# Patient Record
Sex: FEMALE | Race: WHITE | NOT HISPANIC OR LATINO | Employment: UNEMPLOYED | URBAN - METROPOLITAN AREA
[De-identification: names, ages, dates, MRNs, and addresses within clinical notes are randomized per-mention and may not be internally consistent; named-entity substitution may affect disease eponyms.]

---

## 2017-03-22 ENCOUNTER — OFFICE VISIT (OUTPATIENT)
Dept: SPORTS MEDICINE | Facility: CLINIC | Age: 52
End: 2017-03-22

## 2017-03-22 VITALS
BODY MASS INDEX: 21.34 KG/M2 | HEIGHT: 64 IN | RESPIRATION RATE: 16 BRPM | HEART RATE: 72 BPM | WEIGHT: 125 LBS | SYSTOLIC BLOOD PRESSURE: 132 MMHG | DIASTOLIC BLOOD PRESSURE: 82 MMHG | OXYGEN SATURATION: 99 %

## 2017-03-22 DIAGNOSIS — G89.29 CHRONIC PAIN OF RIGHT KNEE: Primary | ICD-10-CM

## 2017-03-22 DIAGNOSIS — M25.561 CHRONIC PAIN OF RIGHT KNEE: Primary | ICD-10-CM

## 2017-03-22 PROCEDURE — 20610 DRAIN/INJ JOINT/BURSA W/O US: CPT | Performed by: FAMILY MEDICINE

## 2017-03-22 PROCEDURE — 73562 X-RAY EXAM OF KNEE 3: CPT | Performed by: FAMILY MEDICINE

## 2017-03-22 PROCEDURE — 99204 OFFICE O/P NEW MOD 45 MIN: CPT | Performed by: FAMILY MEDICINE

## 2017-03-22 RX ORDER — TRIAMCINOLONE ACETONIDE 40 MG/ML
80 INJECTION, SUSPENSION INTRA-ARTICULAR; INTRAMUSCULAR ONCE
Status: COMPLETED | OUTPATIENT
Start: 2017-03-22 | End: 2017-03-22

## 2017-03-22 RX ORDER — LORATADINE 10 MG/1
10 TABLET ORAL DAILY
COMMUNITY
End: 2020-01-15

## 2017-03-22 RX ADMIN — TRIAMCINOLONE ACETONIDE 80 MG: 40 INJECTION, SUSPENSION INTRA-ARTICULAR; INTRAMUSCULAR at 15:11

## 2017-03-22 NOTE — PROGRESS NOTES
"Kita is a 51 y.o. year old female    Chief Complaint   Patient presents with   • Knee Pain     Rt knee pain for a month. Has gotten worse in the last week.        History of Present Illness  Here today for R knee pain for about a month. Mostly posterior, started about a month ago without any specific injury but worsening after exercise. Moderately severe. No assoc sx or radiation.    pmh left meniscus tear 4 years ago, treated with PT; had tight ITB; also had visco injections    I have reviewed the patient's medical, family, and social history in detail and updated the computerized patient record.    Review of Systems   Constitutional: Negative for fever.   Skin: Negative for wound.   Neurological: Negative for numbness.   All other systems reviewed and are negative.      /82 (BP Location: Left arm, Patient Position: Sitting, Cuff Size: Adult)  Pulse 72  Resp 16  Ht 63.5\" (161.3 cm)  Wt 125 lb (56.7 kg)  SpO2 99%  BMI 21.8 kg/m2     Physical Exam    Vital signs reviewed.   General: No acute distress.  Eyes: conjunctiva clear; pupils equally round and reactive  ENT: external ears and nose atraumatic; oropharynx clear  CV: no peripheral edema, 2+ distal pulses  Resp: normal respiratory effort, no use of accessory muscles  Skin: no rashes or wounds; normal turgor  Psych: mood and affect appropriate; recent and remote memory intact  Neuro: sensation to light touch intact    MSK Exam:  R knee: Normal appearance. TTP medial joint line, also mildly in the patellofemoral space. Normal ROM; mild pain with end range flexion. Mildly positive yara. No laxity.    Right Knee X-Ray  Indication: Pain    AP, Lateral, and [place] views    Findings:  No fracture  No bony lesion  Normal soft tissues  Mild degenerative changes, mostly medial and patellofemoral. There is a chronic patellar ossification.    No prior studies were available for comparison.    Knee Injection Procedure Note    Right knee injection was " "discussed with the patient in detail, including indication, risks, benefits, and alternatives. Verbal consent was given for the procedure. Injection was performed by MD.  Injection site was identified by physical examination and cleaned with Betadine and alcohol swabs. Prior to needle insertion, ethyl chloride spray was used for surface anesthesia. Sterile technique was used.  A 25-gauge, 1.5\" needle was directed to the joint from a(n) lateral and anterior approach. Injectate was passed into the joint space without difficulty. The needle was removed and a simple bandage was applied. The procedure was tolerated well without difficulty.    Injection mixture:  1% lidocaine without epinephrine: 2 mL  0.5% bupivacaine without epinephrine: 2 mL  40 mg/mL triamcinolone acetonide: 2 mL    Diagnoses and all orders for this visit:    Chronic pain of right knee  -     XR Knee 3+ View With [place] Right  -     triamcinolone acetonide (KENALOG-40) injection 80 mg; Inject 2 mL into the joint 1 (One) Time.    Other orders  -     loratadine (CLARITIN) 10 MG tablet; Take 10 mg by mouth Daily.  -     PENNSAID 2 % solution; Apply 1-2 pumps to affected area BID prn pain    Discussed potential underlying diagnoses; flare of OA vs meniscal tear being most likely. Agreed on injection today which was tolerated well. F/u based on progress.  "

## 2017-05-03 ENCOUNTER — OFFICE VISIT (OUTPATIENT)
Dept: SPORTS MEDICINE | Facility: CLINIC | Age: 52
End: 2017-05-03

## 2017-05-03 VITALS
HEIGHT: 64 IN | BODY MASS INDEX: 22.02 KG/M2 | DIASTOLIC BLOOD PRESSURE: 72 MMHG | HEART RATE: 72 BPM | SYSTOLIC BLOOD PRESSURE: 115 MMHG | OXYGEN SATURATION: 100 % | WEIGHT: 129 LBS | RESPIRATION RATE: 14 BRPM

## 2017-05-03 DIAGNOSIS — M25.361 KNEE INSTABILITY, RIGHT: ICD-10-CM

## 2017-05-03 DIAGNOSIS — M25.561 ACUTE PAIN OF RIGHT KNEE: Primary | ICD-10-CM

## 2017-05-03 DIAGNOSIS — M25.461 KNEE EFFUSION, RIGHT: ICD-10-CM

## 2017-05-03 PROCEDURE — 99213 OFFICE O/P EST LOW 20 MIN: CPT | Performed by: FAMILY MEDICINE

## 2017-05-03 RX ORDER — PREDNISONE 50 MG/1
50 TABLET ORAL DAILY
Qty: 7 TABLET | Refills: 0 | Status: SHIPPED | OUTPATIENT
Start: 2017-05-03 | End: 2017-05-10

## 2017-05-10 ENCOUNTER — HOSPITAL ENCOUNTER (OUTPATIENT)
Dept: MRI IMAGING | Facility: HOSPITAL | Age: 52
Discharge: HOME | End: 2017-05-10
Admitting: FAMILY MEDICINE

## 2017-05-10 DIAGNOSIS — M25.361 KNEE INSTABILITY, RIGHT: ICD-10-CM

## 2017-05-10 DIAGNOSIS — M25.561 ACUTE PAIN OF RIGHT KNEE: ICD-10-CM

## 2017-05-10 DIAGNOSIS — M25.461 KNEE EFFUSION, RIGHT: ICD-10-CM

## 2017-05-10 PROCEDURE — 73721 MRI JNT OF LWR EXTRE W/O DYE: CPT

## 2017-05-15 ENCOUNTER — TELEPHONE (OUTPATIENT)
Dept: SPORTS MEDICINE | Facility: CLINIC | Age: 52
End: 2017-05-15

## 2017-05-22 ENCOUNTER — OFFICE VISIT (OUTPATIENT)
Dept: SPORTS MEDICINE | Facility: CLINIC | Age: 52
End: 2017-05-22

## 2017-05-22 VITALS
OXYGEN SATURATION: 98 % | BODY MASS INDEX: 22.2 KG/M2 | WEIGHT: 130 LBS | SYSTOLIC BLOOD PRESSURE: 112 MMHG | HEIGHT: 64 IN | HEART RATE: 75 BPM | DIASTOLIC BLOOD PRESSURE: 80 MMHG

## 2017-05-22 DIAGNOSIS — S83.241A ACUTE TEAR MEDIAL MENISCUS, RIGHT, INITIAL ENCOUNTER: ICD-10-CM

## 2017-05-22 DIAGNOSIS — M25.561 ACUTE PAIN OF RIGHT KNEE: Primary | ICD-10-CM

## 2017-05-22 PROCEDURE — 99213 OFFICE O/P EST LOW 20 MIN: CPT | Performed by: FAMILY MEDICINE

## 2017-05-25 ENCOUNTER — OFFICE VISIT (OUTPATIENT)
Dept: SPORTS MEDICINE | Facility: CLINIC | Age: 52
End: 2017-05-25

## 2017-05-25 VITALS
HEART RATE: 82 BPM | OXYGEN SATURATION: 100 % | SYSTOLIC BLOOD PRESSURE: 110 MMHG | DIASTOLIC BLOOD PRESSURE: 70 MMHG | RESPIRATION RATE: 14 BRPM | BODY MASS INDEX: 22.2 KG/M2 | WEIGHT: 130 LBS | HEIGHT: 64 IN

## 2017-05-25 DIAGNOSIS — S83.241D ACUTE TEAR MEDIAL MENISCUS, RIGHT, SUBSEQUENT ENCOUNTER: Primary | ICD-10-CM

## 2017-05-25 PROCEDURE — 0232T NJX PLATELET PLASMA: CPT | Performed by: FAMILY MEDICINE

## 2017-06-07 ENCOUNTER — OFFICE VISIT (OUTPATIENT)
Dept: SPORTS MEDICINE | Facility: CLINIC | Age: 52
End: 2017-06-07

## 2017-06-07 VITALS
HEART RATE: 82 BPM | HEIGHT: 64 IN | DIASTOLIC BLOOD PRESSURE: 76 MMHG | OXYGEN SATURATION: 98 % | BODY MASS INDEX: 22.02 KG/M2 | SYSTOLIC BLOOD PRESSURE: 122 MMHG | WEIGHT: 129 LBS | TEMPERATURE: 98.1 F

## 2017-06-07 DIAGNOSIS — J30.1 SEASONAL ALLERGIC RHINITIS DUE TO POLLEN: ICD-10-CM

## 2017-06-07 DIAGNOSIS — Z86.010 HISTORY OF COLON POLYPS: ICD-10-CM

## 2017-06-07 DIAGNOSIS — M25.561 CHRONIC PAIN OF RIGHT KNEE: ICD-10-CM

## 2017-06-07 DIAGNOSIS — S83.241D ACUTE TEAR MEDIAL MENISCUS, RIGHT, SUBSEQUENT ENCOUNTER: Primary | ICD-10-CM

## 2017-06-07 DIAGNOSIS — G89.29 CHRONIC PAIN OF RIGHT KNEE: ICD-10-CM

## 2017-06-07 PROCEDURE — 99214 OFFICE O/P EST MOD 30 MIN: CPT | Performed by: FAMILY MEDICINE

## 2017-06-07 NOTE — PROGRESS NOTES
"Kita is a 51 y.o. year old female    Chief Complaint   Patient presents with   • Establish Care     R knee pain, AR       History of Present Illness   HPI Comments: R knee pain, medial meniscus tear: She is 2 weeks since PRP injection by me.  First week after injection, she did have significant amount of pain but this has subsided.  Occasionally feels a click but overall she is feeling much better.  Has yet to do any physical activity as recommended.  Would like to get back in to physical therapy as in the past, she has had problems with iliotibial band syndrome.    History of colon polyps: Last colonoscopy approximately 2-1/2 years ago and she had a polyp removed.  Maternal history of colon cancer diagnosed in her 50s.  She is due for repeat colonoscopy this fall.    AR: Persistent.  Worsened since she moved from [place] to [place].  Takes Claritin which helps.    HMP: Is due for mammogram, Pap smear this year.       I have reviewed the patient's medical history in detail and updated the computerized patient record.    Review of Systems   Constitutional: Negative for chills, fatigue and fever.   HENT: Negative for congestion, rhinorrhea and sinus pressure.    Respiratory: Negative for chest tightness and shortness of breath.    Cardiovascular: Negative for chest pain.   Gastrointestinal: Negative for abdominal pain.   Musculoskeletal: Negative for arthralgias.        Per history of present illness   Skin: Negative for rash and wound.   Allergic/Immunologic: Negative for environmental allergies.   Neurological: Negative for numbness and headaches.   Hematological: Negative for adenopathy.       /76  Pulse 82  Temp 98.1 °F (36.7 °C)  Ht 63.5\" (161.3 cm)  Wt 129 lb (58.5 kg)  LMP  (LMP Unknown)  SpO2 98%  BMI 22.49 kg/m2     Physical Exam   Constitutional: She is oriented to person, place, and time. She appears well-developed and well-nourished.   HENT:   Head: Normocephalic and atraumatic. "   Right Ear: External ear normal.   Left Ear: External ear normal.   Nose: Nose normal.   Mouth/Throat: Oropharynx is clear and moist.   Eyes: EOM are normal.   Neck: Normal range of motion.   Cardiovascular: Normal rate and regular rhythm.    Pulmonary/Chest: Effort normal and breath sounds normal.   Musculoskeletal:   L knee: no joint effusion, warmth or tenderness. No crepitus. Negative Lachman's. Negative medial and lateral Librado's. No varus or valgus laxity.   R knee: no joint effusion, warmth or tenderness. No crepitus. Negative Lachman's. Negative medial and lateral Librado's. No varus or valgus laxity.    Neurological: She is alert and oriented to person, place, and time.   Skin: Skin is warm and dry. No rash noted.   Psychiatric: She has a normal mood and affect. Her behavior is normal.   Nursing note and vitals reviewed.       Diagnoses and all orders for this visit:    Acute tear medial meniscus, right, subsequent encounter  -     Ambulatory Referral to Physical Therapy Evaluate and treat    Chronic pain of right knee  -     Ambulatory Referral to Physical Therapy Evaluate and treat    History of colon polyps    Seasonal allergic rhinitis due to pollen    Other orders  -     Cancel: Ambulatory Referral For Screening Colonoscopy      1, 2.  Stable but persistent.  At this time, I recommend that she start physical therapy.  Okay to initiate light cardio activity at home.  3.  Due for repeat colonoscopy in the fall.  4.  Stable.  Takes over-the-counter Claritin.

## 2017-06-08 ENCOUNTER — TREATMENT (OUTPATIENT)
Dept: PHYSICAL THERAPY | Facility: CLINIC | Age: 52
End: 2017-06-08

## 2017-06-08 DIAGNOSIS — M25.561 CHRONIC PAIN OF RIGHT KNEE: ICD-10-CM

## 2017-06-08 DIAGNOSIS — G89.29 CHRONIC PAIN OF RIGHT KNEE: ICD-10-CM

## 2017-06-08 DIAGNOSIS — S83.241D ACUTE TEAR MEDIAL MENISCUS, RIGHT, SUBSEQUENT ENCOUNTER: Primary | ICD-10-CM

## 2017-06-08 PROCEDURE — 97161 PT EVAL LOW COMPLEX 20 MIN: CPT | Performed by: PHYSICAL THERAPIST

## 2017-06-08 PROCEDURE — 97140 MANUAL THERAPY 1/> REGIONS: CPT | Performed by: PHYSICAL THERAPIST

## 2017-06-08 PROCEDURE — 97033 APP MDLTY 1+IONTPHRSIS EA 15: CPT | Performed by: PHYSICAL THERAPIST

## 2017-06-08 PROCEDURE — 97110 THERAPEUTIC EXERCISES: CPT | Performed by: PHYSICAL THERAPIST

## 2017-06-08 NOTE — PROGRESS NOTES
Physical Therapy Initial Evaluation and Plan of Care    Patient: Kita Zamora   : 1965  Diagnosis/ICD-10 Code:  Acute tear medial meniscus, right, subsequent encounter [S83.241D]  Referring practitioner: Alphonso Kaufman *    Subjective Evaluation    History of Present Illness  Mechanism of injury: PMH of L knee meniscus tear.  Started noticing tightness right before spring break and got a cortisone shot which helped.  Then a few weeks later, was doing a home workout and felt a pop followed by swelling.  Was given an oral steroid and got a PRP injection 2 weeks ago.  Hasn't been exercising for the last month.  Notices a lot of cracking and popping in knee.  Has been avoiding stairs due to pain.  Pt reports locking and giving way episodes (feels like its hyperextending)    LEFS 49/80    Pain  Current pain ratin  At worst pain ratin  Location: popliteal fossa and M/LJL  Quality: dull ache  Relieving factors: rest    Diagnostic Tests  X-ray: abnormal (OA)  MRI studies: abnormal (medial meniscal tear)    Treatments  Previous treatment: injection treatment and medication  Patient Goals  Patient goals for therapy: return to sport/leisure activities, independence with ADLs/IADLs and decreased pain  Patient goal: get back to normal workout routine and get my knee healthy.           Objective     Tenderness     Right Knee   Tenderness in the medial joint line. No tenderness in the inferior fat pad, inferior patella and lateral joint line.     Active Range of Motion   Left Hip   External rotation (90/90): 42 degrees   Internal rotation (90/90): 34 degrees     Right Hip   External rotation (90/90): 35 degrees   Internal rotation (90/90): 26 degrees   Left Knee   Flexion: 132 degrees   Extension: 0 degrees     Right Knee   Flexion: 125 degrees   Extension: 0 degrees     Strength/Myotome Testing     Right Hip   Planes of Motion   Extension: 5  Abduction: 5    Right Knee   Normal strength    Tests     Right  Knee   Positive bounce home.          Assessment & Plan     Assessment  Impairments: abnormal or restricted ROM, activity intolerance and pain with function  Assessment details: Pt would benefit from skilled care for the listed deficits. Signs and symptoms are consistent with the referring physician's diagnosis. Patient is a good candidate for skilled physical therapy to decrease pain and restore tolerance to ADLs.    Prognosis: good  Prognosis details: STG In 4-6 weeks  1. Pt to be independent with HEP  2. Pt to exhibit full, non painful R knee flex AROM to 130 to allow for improved ability to navigate curbs and stairs  3. Pt to report decreased pain with stairclimbing.     LTG In 6-12 weeks  1. Pt to resume home workouts with video instruction avoiding pivoting without pain > 2/10  2. LEFS >/= 70/80  3. Pt to perform squat to 90 degrees without pain or lateral shift  4. Pain not > 2/10 with ADLs  5. Pt to tolerate lunges allowing for joint compression without joint line pain.          Goals  get back to normal workout routine and get my knee healthy.    Plan  Therapy options: will be seen for skilled physical therapy services  Planned modality interventions: ultrasound, electrical stimulation/Russian stimulation and cryotherapy  Planned therapy interventions: manual therapy, neuromuscular re-education, soft tissue mobilization, strengthening, stretching and home exercise program  Frequency: 2x week  Duration in weeks: 12  Treatment plan discussed with: patient        Manual PT 52090 8 minutes and Therapy Exercise 76865 10 minutes     Timed Treatment:   26   mins   Total Treatment:     75   mins    PT SIGNATURE: SOFIE Sands License # [id number]  DATE TREATMENT INITIATED: 6/8/2017    Initial Certification  Certification Period: 9/6/2017  I certify that the therapy services are furnished while this patient is under my care.  The services outlined above are required by this patient, and will be reviewed  every 90 days.     PHYSICIAN: Alphonso Kaufman Jr., DO      DATE:     Please sign and return via fax to [phone].. Thank you, [facility].

## 2017-06-08 NOTE — PATIENT INSTRUCTIONS
Access Code: [email or web address]   URL: [email or web address]   Date: 06/08/2017   Prepared by: Hemalatha Schultz     Exercises  Supine Active Straight Leg Raise - 10 reps - 2 sets - 3 hold - 2x daily  Straight Leg Raise with External Rotation - 10 reps - 2 sets - 3 hold - 1x daily  Sidelying Hip Abduction - 10 reps - 2 sets - 3 hold - 1x daily  Prone Hip Extension - One Pillow - 10 reps - 2 sets - 3 hold - 1x daily  Supine ITB Stretch with Strap - 2 reps - 1 sets - 20 hold - 1x daily

## 2017-06-12 ENCOUNTER — TREATMENT (OUTPATIENT)
Dept: PHYSICAL THERAPY | Facility: CLINIC | Age: 52
End: 2017-06-12

## 2017-06-12 DIAGNOSIS — G89.29 CHRONIC PAIN OF RIGHT KNEE: ICD-10-CM

## 2017-06-12 DIAGNOSIS — S83.241D ACUTE TEAR MEDIAL MENISCUS, RIGHT, SUBSEQUENT ENCOUNTER: Primary | ICD-10-CM

## 2017-06-12 DIAGNOSIS — M25.561 CHRONIC PAIN OF RIGHT KNEE: ICD-10-CM

## 2017-06-12 PROCEDURE — 97110 THERAPEUTIC EXERCISES: CPT | Performed by: PHYSICAL THERAPIST

## 2017-06-12 PROCEDURE — 97035 APP MDLTY 1+ULTRASOUND EA 15: CPT | Performed by: PHYSICAL THERAPIST

## 2017-06-12 PROCEDURE — G0283 ELEC STIM OTHER THAN WOUND: HCPCS | Performed by: PHYSICAL THERAPIST

## 2017-06-12 PROCEDURE — A9999 DME SUPPLY OR ACCESSORY, NOS: HCPCS | Performed by: PHYSICAL THERAPIST

## 2017-06-12 PROCEDURE — 97140 MANUAL THERAPY 1/> REGIONS: CPT | Performed by: PHYSICAL THERAPIST

## 2017-06-12 NOTE — PATIENT INSTRUCTIONS
Access Code: [email or web address]   URL: [email or web address]   Date: 06/12/2017   Prepared by: Hemalatha Schultz     Exercises  Standing Terminal Knee Extension with Resistance - 15 reps - 1 sets - 5 hold - 1x daily    Pt issued green TB for HEP

## 2017-06-12 NOTE — PROGRESS NOTES
Physical Therapy Daily Progress Note    Visit # : 2  Kita Lay reports: has been able to cycle.  Noticed some popping in my knee getting out of the car.    Subjective     Objective   See Exercise, Manual, and Modality Logs for complete treatment.       Assessment/Plan  Mild soreness with exercise progression into TKE.  Good tolerance to education of leg position for massage stick home technique.  Progress per Plan of Care             Manual PT 24169 10 minutes and Therapy Exercise 06885 20 minutes       Timed Treatment:   38   mins   Total Treatment:     55   mins        Chela Schultz PT  Physical Therapist  [place] License # [id number]

## 2017-06-14 ENCOUNTER — TREATMENT (OUTPATIENT)
Dept: PHYSICAL THERAPY | Facility: CLINIC | Age: 52
End: 2017-06-14

## 2017-06-14 DIAGNOSIS — G89.29 CHRONIC PAIN OF RIGHT KNEE: ICD-10-CM

## 2017-06-14 DIAGNOSIS — M25.561 CHRONIC PAIN OF RIGHT KNEE: ICD-10-CM

## 2017-06-14 DIAGNOSIS — S83.241D ACUTE TEAR MEDIAL MENISCUS, RIGHT, SUBSEQUENT ENCOUNTER: Primary | ICD-10-CM

## 2017-06-14 PROCEDURE — 97110 THERAPEUTIC EXERCISES: CPT | Performed by: PHYSICAL THERAPIST

## 2017-06-14 PROCEDURE — 97140 MANUAL THERAPY 1/> REGIONS: CPT | Performed by: PHYSICAL THERAPIST

## 2017-06-14 NOTE — PROGRESS NOTES
Physical Therapy Daily Progress Note    Visit #3    Subjective     Kita Lay reports: adherence with HEP, got a massage stick as recommended.      Objective   See Exercise, Manual, and Modality Logs for complete treatment.       Assessment/Plan  Soft tissue restriction noted ITB, hamstring, distal quad.  Pt requires cueing for ITB stretch to avoid lumbar rotation.  Progress per Plan of Care           Manual Therapy:    10     mins  72479;  Therapeutic Exercise:    30     mins  75774;     Neuromuscular Omer:    0    mins  99953;    Therapeutic Activity:     0     mins  93053;     Gait Trainin     mins  83868;     Ultrasound:     0     mins  33024;    Electrical Stimulation:    0     mins  49756 ( );    Timed Treatment:   40   mins   Total Treatment:     40   mins    Jane Velazquez PT, DPT  Physical Therapist  [place] License #[id number]

## 2017-06-19 ENCOUNTER — TREATMENT (OUTPATIENT)
Dept: PHYSICAL THERAPY | Facility: CLINIC | Age: 52
End: 2017-06-19

## 2017-06-19 DIAGNOSIS — G89.29 CHRONIC PAIN OF RIGHT KNEE: ICD-10-CM

## 2017-06-19 DIAGNOSIS — S83.241D ACUTE TEAR MEDIAL MENISCUS, RIGHT, SUBSEQUENT ENCOUNTER: Primary | ICD-10-CM

## 2017-06-19 DIAGNOSIS — M25.561 CHRONIC PAIN OF RIGHT KNEE: ICD-10-CM

## 2017-06-19 PROCEDURE — 97110 THERAPEUTIC EXERCISES: CPT | Performed by: PHYSICAL THERAPIST

## 2017-06-19 PROCEDURE — G0283 ELEC STIM OTHER THAN WOUND: HCPCS | Performed by: PHYSICAL THERAPIST

## 2017-06-19 PROCEDURE — 97140 MANUAL THERAPY 1/> REGIONS: CPT | Performed by: PHYSICAL THERAPIST

## 2017-06-19 NOTE — PATIENT INSTRUCTIONS
Access Code: [email or web address]   URL: [email or web address]   Date: 06/19/2017   Prepared by: Hemalatha Schultz     Exercises  Hip Hiking on Step - 15 reps - 1 sets - 1x daily  Single Leg Stance - 2 reps - 1 sets - 30 hold - 1x daily  Supine Peroneal Nerve Glide - 10 reps - 1 sets - 10 hold - 2x daily

## 2017-06-19 NOTE — PROGRESS NOTES
Physical Therapy Daily Progress Note    Visit # : 4  Kita Lay reports: knee is feeling a little better; still noticing popliteal tenderness    Subjective     Objective   See Exercise, Manual, and Modality Logs for complete treatment.       Assessment/Plan  Good tolerance to exercise progression.  Pt reporting mild PF symptoms with TKE.  Progress strengthening /stabilization /functional activity             Manual PT 06794 10 minutes, Therapy Exercise 29322 35 minutes and NMR 65286 5 minutes       Timed Treatment:   50   mins   Total Treatment:     65   mins        Chela Schultz, PT  Physical Therapist  [place] License # [id number]

## 2017-06-22 ENCOUNTER — TREATMENT (OUTPATIENT)
Dept: PHYSICAL THERAPY | Facility: CLINIC | Age: 52
End: 2017-06-22

## 2017-06-22 DIAGNOSIS — G89.29 CHRONIC PAIN OF RIGHT KNEE: ICD-10-CM

## 2017-06-22 DIAGNOSIS — M25.561 CHRONIC PAIN OF RIGHT KNEE: ICD-10-CM

## 2017-06-22 DIAGNOSIS — S83.241D ACUTE TEAR MEDIAL MENISCUS, RIGHT, SUBSEQUENT ENCOUNTER: Primary | ICD-10-CM

## 2017-06-22 PROCEDURE — 97112 NEUROMUSCULAR REEDUCATION: CPT | Performed by: PHYSICAL THERAPIST

## 2017-06-22 PROCEDURE — 97110 THERAPEUTIC EXERCISES: CPT | Performed by: PHYSICAL THERAPIST

## 2017-06-22 PROCEDURE — 97140 MANUAL THERAPY 1/> REGIONS: CPT | Performed by: PHYSICAL THERAPIST

## 2017-06-22 PROCEDURE — G0283 ELEC STIM OTHER THAN WOUND: HCPCS | Performed by: PHYSICAL THERAPIST

## 2017-06-22 NOTE — PROGRESS NOTES
Physical Therapy Daily Progress Note    Visit # : 5  Kita Lay reports: knee continues to feel better.  [place] [facility] [place] a long weekend later today.  Plans to use taxi cabs to avoid excessive stairclimbing.  Pt reporting occasional giving way episodes but denies locking.     Subjective     Objective   See Exercise, Manual, and Modality Logs for complete treatment.       Assessment/Plan  Pt fell backwards onto buttocks after balancing on Indo board but stood up in no apparent distress and was able to complete CKC exercises without increased symptoms.  Pt demonstrates normal walking pattern in clinic.    Progress strengthening /stabilization /functional activity           Manual Therapy:    10     mins  99681;  Therapeutic Exercise:    30     mins  47585;     Neuromuscular Omer:    8    mins  91055;    Therapeutic Activity:     -     mins  47675;     Gait Training:      -     mins  24340;     Ultrasound:     -     mins  76177;    Electrical Stimulation:    15     mins  01853 ( );  Dry Needling     -     mins self-pay      Timed Treatment:   48   mins   Total Treatment:     65   mins        Chela Schultz PT  Physical Therapist  [place] License # [id number]

## 2017-06-26 ENCOUNTER — TREATMENT (OUTPATIENT)
Dept: PHYSICAL THERAPY | Facility: CLINIC | Age: 52
End: 2017-06-26

## 2017-06-26 DIAGNOSIS — S83.241D ACUTE TEAR MEDIAL MENISCUS, RIGHT, SUBSEQUENT ENCOUNTER: Primary | ICD-10-CM

## 2017-06-26 DIAGNOSIS — G89.29 CHRONIC PAIN OF RIGHT KNEE: ICD-10-CM

## 2017-06-26 DIAGNOSIS — M25.561 CHRONIC PAIN OF RIGHT KNEE: ICD-10-CM

## 2017-06-26 PROCEDURE — 97110 THERAPEUTIC EXERCISES: CPT | Performed by: PHYSICAL THERAPIST

## 2017-06-26 PROCEDURE — 97140 MANUAL THERAPY 1/> REGIONS: CPT | Performed by: PHYSICAL THERAPIST

## 2017-06-26 PROCEDURE — G0283 ELEC STIM OTHER THAN WOUND: HCPCS | Performed by: PHYSICAL THERAPIST

## 2017-06-26 NOTE — PROGRESS NOTES
Physical Therapy Daily Progress Note    Visit # : 6  Kita Lay reports: spent the weekend in [facility] and did a lot of walking.  No episodes of locking or giving way     Subjective     Objective   See Exercise, Manual, and Modality Logs for complete treatment.       Assessment/Plan  Pt tolerated all treatment well.  Exercises were not progressed as pt returned home very late due to airport delays.    Progress per Plan of Care           Manual Therapy:    10     mins  92434;  Therapeutic Exercise:    35     mins  82388;     Neuromuscular Omer:    5    mins  16805;    Therapeutic Activity:     -     mins  12468;     Gait Training:      -     mins  29274;     Ultrasound:     -     mins  74090;    Electrical Stimulation:    15     mins  33968 ( );  Dry Needling     -     mins self-pay      Timed Treatment:   50   mins   Total Treatment:     70   mins        Chela Schultz PT  Physical Therapist  [place] License # [id number]

## 2017-06-29 ENCOUNTER — TREATMENT (OUTPATIENT)
Dept: PHYSICAL THERAPY | Facility: CLINIC | Age: 52
End: 2017-06-29

## 2017-06-29 DIAGNOSIS — S83.241D ACUTE TEAR MEDIAL MENISCUS, RIGHT, SUBSEQUENT ENCOUNTER: Primary | ICD-10-CM

## 2017-06-29 DIAGNOSIS — M25.561 CHRONIC PAIN OF RIGHT KNEE: ICD-10-CM

## 2017-06-29 DIAGNOSIS — G89.29 CHRONIC PAIN OF RIGHT KNEE: ICD-10-CM

## 2017-06-29 PROCEDURE — 97140 MANUAL THERAPY 1/> REGIONS: CPT | Performed by: PHYSICAL THERAPIST

## 2017-06-29 PROCEDURE — G0283 ELEC STIM OTHER THAN WOUND: HCPCS | Performed by: PHYSICAL THERAPIST

## 2017-06-29 PROCEDURE — 97110 THERAPEUTIC EXERCISES: CPT | Performed by: PHYSICAL THERAPIST

## 2017-06-29 NOTE — PROGRESS NOTES
"Physical Therapy Daily Progress Note    Visit # : 7  Kita Lay reports: son got his wisdom teeth out and had to go up and down the stairs multiple times and knee is sore.      Subjective     Objective   See Exercise, Manual, and Modality Logs for complete treatment.       Assessment/Plan  Pt educated on stair modifications to decrease stress on knee.  Good tolerance to step activities in PT as kept to 4\" height.    Progress strengthening /stabilization /functional activity           Manual Therapy:    10     mins  78790;  Therapeutic Exercise:    35     mins  99218;     Neuromuscular Omer:    2    mins  26872;    Therapeutic Activity:     -     mins  47624;     Gait Training:      -     mins  32898;     Ultrasound:     -     mins  57494;    Electrical Stimulation:    15     mins  73184 ( );  Dry Needling     -     mins self-pay      Timed Treatment:   47   mins   Total Treatment:     65   mins        Chela Schultz, PT  Physical Therapist  [place] License # [id number]      "

## 2017-07-05 ENCOUNTER — OFFICE VISIT (OUTPATIENT)
Dept: SPORTS MEDICINE | Facility: CLINIC | Age: 52
End: 2017-07-05

## 2017-07-05 VITALS
BODY MASS INDEX: 22.71 KG/M2 | SYSTOLIC BLOOD PRESSURE: 118 MMHG | OXYGEN SATURATION: 100 % | HEIGHT: 64 IN | RESPIRATION RATE: 14 BRPM | DIASTOLIC BLOOD PRESSURE: 74 MMHG | WEIGHT: 133 LBS | HEART RATE: 88 BPM

## 2017-07-05 DIAGNOSIS — M25.561 CHRONIC PAIN OF RIGHT KNEE: ICD-10-CM

## 2017-07-05 DIAGNOSIS — G89.29 CHRONIC PAIN OF RIGHT KNEE: ICD-10-CM

## 2017-07-05 DIAGNOSIS — S83.241D ACUTE TEAR MEDIAL MENISCUS, RIGHT, SUBSEQUENT ENCOUNTER: Primary | ICD-10-CM

## 2017-07-05 PROCEDURE — 99213 OFFICE O/P EST LOW 20 MIN: CPT | Performed by: FAMILY MEDICINE

## 2017-07-05 RX ORDER — MINOCYCLINE HYDROCHLORIDE 65 MG/1
TABLET, FILM COATED, EXTENDED RELEASE ORAL
COMMUNITY
End: 2019-01-09

## 2017-07-05 NOTE — PROGRESS NOTES
"Kita is a 52 y.o. year old female    Chief Complaint   Patient presents with   • Right Knee - Pain   • Follow-up     F/U for rt knee. Still having some pain since last visit. Feeling some better today.        History of Present Illness  R knee pain improving.  She has been going to PT next door.  She is now approximately 6 weeks since PRP injection.  She admits that she has been a little more active than recommended with some of the trips she has taken.  She does a lot of walking in [facility].  She has been more active than normal with helping her son move.  No new symptoms.  She did a lot of stairs recently which may have aggravated her knee but she feels better today.  Has gone back to workouts in the gym but has been limiting squats and lower extremity workouts.    I have reviewed the patient's medical history in detail and updated the computerized patient record.    Review of Systems   Musculoskeletal:        Per hpi   Neurological: Negative for weakness and numbness.   All other systems reviewed and are negative.      /74 (BP Location: Left arm, Patient Position: Sitting, Cuff Size: Adult)  Pulse 88  Resp 14  Ht 63.5\" (161.3 cm)  Wt 133 lb (60.3 kg)  LMP  (LMP Unknown)  SpO2 100%  BMI 23.19 kg/m2     Physical Exam    Vital signs reviewed.   General: No acute distress.  Eyes: conjunctiva clear; pupils equally round and reactive  ENT: external ears and nose atraumatic; oropharynx clear  CV: no peripheral edema, 2+ distal pulses  Resp: normal respiratory effort, no use of accessory muscles  Skin: no rashes or wounds; normal turgor  Psych: mood and affect appropriate; recent and remote memory intact  Neuro: sensation to light touch intact    MSK Exam:  L knee: No tenderness, crepitus or warmth; full range of motion; negative Lachman's; negative medial and lateral Librado's; no varus or valgus laxity  R knee: No tenderness, crepitus or warmth; full range of motion; negative Lachman's; negative " medial and lateral Librado's; no varus or valgus laxity      Diagnoses and all orders for this visit:    Acute tear medial meniscus, right, subsequent encounter    Chronic pain of right knee    Other orders  -     Minocycline HCl ER 65 MG tablet sustained-release 24 hour; Take  by mouth.      Improving.  Explained that at this point, we will have likely seen maximal efficacy from PRP injection.  Continue with physical therapy and I recommend that she get back to her regular routine.  Did discuss utility of repeat injection but for now as sounds like she is progressing nicely.  Follow-up in 4 weeks.

## 2017-07-06 ENCOUNTER — TREATMENT (OUTPATIENT)
Dept: PHYSICAL THERAPY | Facility: CLINIC | Age: 52
End: 2017-07-06

## 2017-07-06 DIAGNOSIS — G89.29 CHRONIC PAIN OF RIGHT KNEE: ICD-10-CM

## 2017-07-06 DIAGNOSIS — M25.561 CHRONIC PAIN OF RIGHT KNEE: ICD-10-CM

## 2017-07-06 DIAGNOSIS — S83.241D ACUTE TEAR MEDIAL MENISCUS, RIGHT, SUBSEQUENT ENCOUNTER: Primary | ICD-10-CM

## 2017-07-06 PROCEDURE — 97140 MANUAL THERAPY 1/> REGIONS: CPT | Performed by: PHYSICAL THERAPIST

## 2017-07-06 PROCEDURE — 97110 THERAPEUTIC EXERCISES: CPT | Performed by: PHYSICAL THERAPIST

## 2017-07-06 PROCEDURE — G0283 ELEC STIM OTHER THAN WOUND: HCPCS | Performed by: PHYSICAL THERAPIST

## 2017-07-06 NOTE — PATIENT INSTRUCTIONS
Access Code: [email or web address]   URL: [email or web address]   Date: 07/06/2017   Prepared by: Hemalatha Schultz     Exercises  QuadriAccess Code: [email or web address]   URL: [email or web address]   Date: 07/06/2017   Prepared by: Hemalatha Schultz     Program Notes   use a towel for the stretch     Exercises  Quadriceps Stretch with Chair - 2 reps - 1 sets - 30 hold - 2x daily  Prone Quadriceps Stretch with Strap - 2 reps - 1 sets - 20s hold - 1x daily  Prone Femoral Nerve Mobilization - 15 reps - 1 sets - 3s hold - 1x dailyceps Stretch with Chair - 2 reps - 1 sets - 30 hold - 2x daily

## 2017-07-06 NOTE — PROGRESS NOTES
Re-Assessment / Re-Certification      Patient: Kita Zamora   : 1965  Diagnosis/ICD-10 Code:  Acute tear medial meniscus, right, subsequent encounter [S83.241D]  Referring practitioner: Alphonso Kaufman *  Date of Initial Visit: 17  Today's Date: 2017  Patient seen for 8 sessions      Subjective:   Subjective Questionnaire: LEFS: 55/80  Clinical Progress: improved  Home Program Compliance: Yes  Treatment has included: therapeutic exercise, manual therapy, electrical stimulation and cryotherapy    Subjective Evaluation    Pain  Current pain rating: 3  At worst pain ratin         Objective     Tenderness     Right Knee   Tenderness in the patellar tendon and quadriceps tendon.     Active Range of Motion     Right Knee   Flexion: 130 (pulling) degrees   Extension: 0 degrees     Strength/Myotome Testing     Right Knee   Flexion: 5  Extension: 5 (pain quad tendon)     Assessment & Plan     Assessment  Impairments: activity intolerance and pain with function  Assessment details: Pt is exhibiting signs of quad tendonitis so IASTM was added to R quad muscle/tendon.  Progress to squats and band walks next visit if quad symptoms decreased.  Pt had some pain with attempts at quad stretching so was given for HEP only if symptoms decrease.  Pt would benefit from a continued course of skilled PT to decrease pain and restore function.       Progress toward previous goals: Partially Met    Goals  STG In 4-6 weeks  1. Pt to be independent with HEP - MET  2. Pt to exhibit full, non painful R knee flex AROM to 130 to allow for improved ability to navigate curbs and stairs - MET  3. Pt to report decreased pain with stairclimbing. - progressing    LTG In 6-12 weeks - UNMET but progressing  1. Pt to resume home workouts with video instruction avoiding pivoting without pain > 2/10  2. LEFS >/= 70/80  3. Pt to perform squat to 90 degrees without pain or lateral shift  4. Pain not > 2/10 with ADLs  5. Pt to tolerate  lunges allowing for joint compression without joint line pain.         Recommendations: Continue as planned  Timeframe: 2 months  Prognosis to achieve goals: good    PT Signature: Chela Schultz, PT  [place] License # [id number]    Manual Therapy:    10     mins  21531;  Therapeutic Exercise:    30     mins  90231;     Neuromuscular Omer:    2    mins  90414;    Therapeutic Activity:     -     mins  58495;     Gait Training:      -     mins  71687;     Ultrasound:     -     mins  34724;    Electrical Stimulation:    15     mins  83646 ( );  Dry Needling     -     mins self-pay      Timed Treatment:   42   mins   Total Treatment:     65   mins

## 2017-07-13 ENCOUNTER — TREATMENT (OUTPATIENT)
Dept: PHYSICAL THERAPY | Facility: CLINIC | Age: 52
End: 2017-07-13

## 2017-07-13 DIAGNOSIS — G89.29 CHRONIC PAIN OF RIGHT KNEE: ICD-10-CM

## 2017-07-13 DIAGNOSIS — M25.561 CHRONIC PAIN OF RIGHT KNEE: ICD-10-CM

## 2017-07-13 DIAGNOSIS — S83.241D ACUTE TEAR MEDIAL MENISCUS, RIGHT, SUBSEQUENT ENCOUNTER: Primary | ICD-10-CM

## 2017-07-13 PROCEDURE — 97110 THERAPEUTIC EXERCISES: CPT | Performed by: PHYSICAL THERAPIST

## 2017-07-13 PROCEDURE — 97032 APPL MODALITY 1+ESTIM EA 15: CPT | Performed by: PHYSICAL THERAPIST

## 2017-07-13 PROCEDURE — 97140 MANUAL THERAPY 1/> REGIONS: CPT | Performed by: PHYSICAL THERAPIST

## 2017-07-13 NOTE — PROGRESS NOTES
Physical Therapy Daily Progress Note    Visit #9    Subjective     Kita Lay reports: that knee has definitely improved since last week and that it does not hurt to ambulate or bear weight. Patient reports that the IASTM helped tremendously for the tightness in her quad muscle.      Objective   See Exercise, Manual, and Modality Logs for complete treatment.       Assessment/Plan  Patient tolerated exercise well today, she still has some tenderness and swelling in her knee. However, it did not affect her treatment session. Patient reported that she did not tolerate nerve glides from the previous session so patient did not attempt them. Patient did well when performing squats therefore therapist incorporated them into the HEP.  Progress per Plan of Care           Manual Therapy:    10     mins  51253;  Therapeutic Exercise:    30     mins  33642;     Neuromuscular Omer:    0    mins  07101;    Therapeutic Activity:     0     mins  46589;     Gait Trainin     mins  57401;     Ultrasound:     0     mins  56381;    Electrical Stimulation:    15     mins  42121 ( );    Timed Treatment:   40   mins   Total Treatment:     60   mins    Marnie Chappell, Student Physical Therapist    Jane Velazquez, PT, DPT  Physical Therapist  [place] License #[id number]

## 2017-07-17 ENCOUNTER — TREATMENT (OUTPATIENT)
Dept: PHYSICAL THERAPY | Facility: CLINIC | Age: 52
End: 2017-07-17

## 2017-07-17 DIAGNOSIS — G89.29 CHRONIC PAIN OF RIGHT KNEE: ICD-10-CM

## 2017-07-17 DIAGNOSIS — S83.241D ACUTE TEAR MEDIAL MENISCUS, RIGHT, SUBSEQUENT ENCOUNTER: Primary | ICD-10-CM

## 2017-07-17 DIAGNOSIS — M25.561 CHRONIC PAIN OF RIGHT KNEE: ICD-10-CM

## 2017-07-17 PROCEDURE — 97140 MANUAL THERAPY 1/> REGIONS: CPT | Performed by: PHYSICAL THERAPIST

## 2017-07-17 PROCEDURE — 97110 THERAPEUTIC EXERCISES: CPT | Performed by: PHYSICAL THERAPIST

## 2017-07-17 PROCEDURE — A9999 DME SUPPLY OR ACCESSORY, NOS: HCPCS | Performed by: PHYSICAL THERAPIST

## 2017-07-17 PROCEDURE — G0283 ELEC STIM OTHER THAN WOUND: HCPCS | Performed by: PHYSICAL THERAPIST

## 2017-07-17 NOTE — PATIENT INSTRUCTIONS
Access Code: [email or web address]   URL: [email or web address]   Date: 07/17/2017   Prepared by: Hemalatha Schultz     Exercises  Supine Active Straight Leg Raise - 10 reps - 2 sets - 3 hold - 2x daily  Straight Leg Raise with External Rotation - 10 reps - 2 sets - 3 hold - 1x daily  Sidelying Hip Abduction - 10 reps - 2 sets - 3 hold - 1x daily  Prone Hip Extension - One Pillow - 15 reps - 2 sets - 3 hold - 1x daily  Supine ITB Stretch with Strap - 2 reps - 1 sets - 20 hold - 1x daily  Lateral Step Ups - 15 reps - 1 sets - 1x daily  Step Up - 15 reps - 1 sets - 1x daily  Side Stepping with Resistance at Thighs - 15 reps - 1 sets - 1x daily  Hip Abduction with Resistance on Platform and Hands Behind Head - 15 reps - 1 sets - 3 hold - 1x daily  Isometric Squat with Resistance Loop - 10 reps - 1 sets - 5 hold - 1x daily  Standing Terminal Knee Extension with Resistance - 20 reps - 1 sets - 5 hold - 1x daily    Issued blue TB loop for HEP

## 2017-07-17 NOTE — PROGRESS NOTES
Physical Therapy Daily Progress Note    Visit # : 10  Kita Lay reports: was in the hospital on and off last week caring for father.  Knee is feeling better; has been using massage stick     Subjective     Objective   See Exercise, Manual, and Modality Logs for complete treatment.       Assessment/Plan  Excellent tolerance to exercise progression without exacerbation of symptoms.  Pt responding favorably to skilled PT.    Progress strengthening /stabilization /functional activity           Manual Therapy:    10     mins  75707;  Therapeutic Exercise:    30     mins  15463;     Neuromuscular Omer:    5    mins  92290;    Therapeutic Activity:     -     mins  07619;     Gait Training:      -     mins  26297;     Ultrasound:     -     mins  29106;    Electrical Stimulation:    15     mins  70255 ( );  Dry Needling     -     mins self-pay      Timed Treatment:   45   mins   Total Treatment:     65   mins        Chela Schultz PT  Physical Therapist  [place] License # [id number]

## 2017-07-20 ENCOUNTER — TREATMENT (OUTPATIENT)
Dept: PHYSICAL THERAPY | Facility: CLINIC | Age: 52
End: 2017-07-20

## 2017-07-20 DIAGNOSIS — S83.241D ACUTE TEAR MEDIAL MENISCUS, RIGHT, SUBSEQUENT ENCOUNTER: Primary | ICD-10-CM

## 2017-07-20 DIAGNOSIS — G89.29 CHRONIC PAIN OF RIGHT KNEE: ICD-10-CM

## 2017-07-20 DIAGNOSIS — M25.561 CHRONIC PAIN OF RIGHT KNEE: ICD-10-CM

## 2017-07-20 PROCEDURE — 97140 MANUAL THERAPY 1/> REGIONS: CPT | Performed by: PHYSICAL THERAPIST

## 2017-07-20 PROCEDURE — G0283 ELEC STIM OTHER THAN WOUND: HCPCS | Performed by: PHYSICAL THERAPIST

## 2017-07-20 PROCEDURE — 97110 THERAPEUTIC EXERCISES: CPT | Performed by: PHYSICAL THERAPIST

## 2017-07-20 PROCEDURE — 97112 NEUROMUSCULAR REEDUCATION: CPT | Performed by: PHYSICAL THERAPIST

## 2017-07-20 NOTE — PATIENT INSTRUCTIONS
Access Code: [email or web address]   URL: [email or web address]   Date: 07/20/2017   Prepared by: Hemalatha Schultz     Exercises  Supine Active Straight Leg Raise - 10 reps - 2 sets - 3 hold - 2x daily  Straight Leg Raise with External Rotation - 10 reps - 2 sets - 3 hold - 1x daily  Sidelying Hip Abduction - 10 reps - 2 sets - 3 hold - 1x daily  Prone Hip Extension - One Pillow - 15 reps - 2 sets - 3 hold - 1x daily  Supine ITB Stretch with Strap - 2 reps - 1 sets - 20 hold - 1x daily  Lateral Step Ups - 15 reps - 1 sets - 1x daily  Step Up - 15 reps - 1 sets - 1x daily  Band Walks - 15 reps - 1 sets - 1x daily  Reverse Band Walks - 15 reps - 1 sets - 1x daily  Side Stepping with Resistance at Thighs - 15 reps - 1 sets - 1x daily  Hip Abduction with Resistance on Platform and Hands Behind Head - 15 reps - 1 sets - 3 hold - 1x daily  Isometric Squat with Resistance Loop - 10 reps - 1 sets - 5 hold - 1x daily  Standing Terminal Knee Extension with Resistance - 20 reps - 1 sets - 5 hold - 1x daily

## 2017-07-20 NOTE — PROGRESS NOTES
Physical Therapy Daily Progress Note    Visit # : 11  Kita Lay reports: My knee is feeling really good.  Getting ready to go out of town for an [employer] cruise.  Plans to bring TB for HEP.    Subjective     Objective   See Exercise, Manual, and Modality Logs for complete treatment.       Assessment/Plan  Pt able to perform squat in good form without lateral shift.  Anticipate patient will continue to improve with compliance to HEP during vacation as able.  Reassess upon return.    Progress strengthening /stabilization /functional activity           Manual Therapy:    10     mins  45324;  Therapeutic Exercise:    30     mins  01161;     Neuromuscular Omer:    10    mins  47117;    Therapeutic Activity:     -     mins  78554;     Gait Training:      -     mins  81045;     Ultrasound:     -     mins  39276;    Electrical Stimulation:    15     mins  65069 ( );  Dry Needling     -     mins self-pay      Timed Treatment:   50   mins   Total Treatment:     70   mins        Chela Schultz PT  Physical Therapist  [place] License # [id number]

## 2017-08-02 ENCOUNTER — OFFICE VISIT (OUTPATIENT)
Dept: SPORTS MEDICINE | Facility: CLINIC | Age: 52
End: 2017-08-02

## 2017-08-02 VITALS
DIASTOLIC BLOOD PRESSURE: 84 MMHG | RESPIRATION RATE: 14 BRPM | BODY MASS INDEX: 23.08 KG/M2 | SYSTOLIC BLOOD PRESSURE: 122 MMHG | WEIGHT: 135.2 LBS | OXYGEN SATURATION: 99 % | HEART RATE: 73 BPM | HEIGHT: 64 IN

## 2017-08-02 DIAGNOSIS — S83.241D ACUTE TEAR MEDIAL MENISCUS, RIGHT, SUBSEQUENT ENCOUNTER: Primary | ICD-10-CM

## 2017-08-02 DIAGNOSIS — M25.561 CHRONIC PAIN OF RIGHT KNEE: ICD-10-CM

## 2017-08-02 DIAGNOSIS — G89.29 CHRONIC PAIN OF RIGHT KNEE: ICD-10-CM

## 2017-08-02 PROCEDURE — 99213 OFFICE O/P EST LOW 20 MIN: CPT | Performed by: FAMILY MEDICINE

## 2017-08-02 NOTE — PROGRESS NOTES
"Kita is a 52 y.o. year old female    Chief Complaint   Patient presents with   • Follow-up     F/U for rt knee. Pain is better since last visit.        History of Present Illness  R knee pain is improving.  PRP was performed 5/25/17 on her right knee for acute medial meniscus tear.  She has been going to physical therapy but has not been over the past 2 weeks due to an [place] cruise.  She took her bands with her and did some of her therapy there.  She [place] and noted some discomfort on inside of her knee.  This had resolved when she woke up this morning.  Overall, \"I feel 80% better\".  She has follow-up physical therapy next-door scheduled for tomorrow.  Feels like she is progressing much better in regards to this tear when she compares her tear of her left knee in the past.    I have reviewed the patient's medical history in detail and updated the computerized patient record.    Review of Systems   Musculoskeletal:        Per history of present illness   Neurological: Negative for weakness and numbness.   All other systems reviewed and are negative.      /84 (BP Location: Left arm, Patient Position: Sitting, Cuff Size: Adult)  Pulse 73  Resp 14  Ht 63.5\" (161.3 cm)  Wt 135 lb 3.2 oz (61.3 kg)  LMP  (LMP Unknown)  SpO2 99%  BMI 23.57 kg/m2     Physical Exam    Vital signs reviewed.   General: No acute distress.  Eyes: conjunctiva clear; pupils equally round and reactive  ENT: external ears and nose atraumatic; oropharynx clear  CV: no peripheral edema, 2+ distal pulses  Resp: normal respiratory effort, no use of accessory muscles  Skin: no rashes or wounds; normal turgor  Psych: mood and affect appropriate; recent and remote memory intact  Neuro: sensation to light touch intact    MSK Exam:  L knee: no joint effusion, warmth or tenderness. No crepitus. Negative Lachman's. Negative medial and lateral Librado's. No varus or valgus laxity. "   R knee: no joint effusion, warmth.  Minimal tenderness on the medial joint line in deep flexion.  No crepitus. Negative Lachman's. Negative medial and lateral Librado's. No varus or valgus laxity.     Diagnoses and all orders for this visit:    Acute tear medial meniscus, right, subsequent encounter    Chronic pain of right knee      Improving.  Continue physical therapy.  I'm pleased with the progress she has made following PRP injection.  Continue to monitor.

## 2017-08-03 ENCOUNTER — TREATMENT (OUTPATIENT)
Dept: PHYSICAL THERAPY | Facility: CLINIC | Age: 52
End: 2017-08-03

## 2017-08-03 DIAGNOSIS — G89.29 CHRONIC PAIN OF RIGHT KNEE: ICD-10-CM

## 2017-08-03 DIAGNOSIS — S83.241D ACUTE TEAR MEDIAL MENISCUS, RIGHT, SUBSEQUENT ENCOUNTER: Primary | ICD-10-CM

## 2017-08-03 DIAGNOSIS — M25.561 CHRONIC PAIN OF RIGHT KNEE: ICD-10-CM

## 2017-08-03 PROCEDURE — G0283 ELEC STIM OTHER THAN WOUND: HCPCS | Performed by: PHYSICAL THERAPIST

## 2017-08-03 PROCEDURE — 97140 MANUAL THERAPY 1/> REGIONS: CPT | Performed by: PHYSICAL THERAPIST

## 2017-08-03 PROCEDURE — 97110 THERAPEUTIC EXERCISES: CPT | Performed by: PHYSICAL THERAPIST

## 2017-08-03 PROCEDURE — 97112 NEUROMUSCULAR REEDUCATION: CPT | Performed by: PHYSICAL THERAPIST

## 2017-08-03 NOTE — PATIENT INSTRUCTIONS
Access Code: [email or web address]   URL: [email or web address]   Date: 08/03/2017   Prepared by: Hemalatha Schultz     Exercises  Supine Active Straight Leg Raise - 10 reps - 2 sets - 3 hold - 2x daily  Straight Leg Raise with External Rotation - 10 reps - 2 sets - 3 hold - 1x daily  Band Walks - 15 reps - 1 sets - 1x daily  Reverse Band Walks - 15 reps - 1 sets - 1x daily  Side Stepping with Resistance at Thighs - 15 reps - 1 sets - 1x daily  Hip Abduction with Resistance on Platform and Hands Behind Head - 15 reps - 1 sets - 3 hold - 1x daily  Isometric Squat with Resistance Loop - 10 reps - 1 sets - 5 hold - 1x daily  Upright Side Lunge - 10 reps - 1 sets - 3 hold - 1x daily  Standard Plank - 2 reps - 1 sets - 20 hold - 1x daily  Plank with Hip Extension - 5 reps - 2 sets - 1x daily  Modified Side Plank with Hip Abduction - 10 reps - 2 sets - 3 hold - 1x daily  Sidelying IT Band Foam Roll Mobilization - 10 reps - 2 sets - 1x daily  Supine ITB Stretch with Strap - 2 reps - 1 sets - 20 hold - 1x daily

## 2017-08-03 NOTE — PROGRESS NOTES
Physical Therapy Daily Progress Note    Visit # : 12  Kita Lay reports: returned from [place] and my knee did really well.  Woke up with some soreness yesterday after work out and long walk the day before.  Anxious to get back to lunges and pylometrics in home workout.    Subjective     Objective   See Exercise, Manual, and Modality Logs for complete treatment.       Assessment/Plan  Attempt forward and post lunges but pt reported R patellofemoral pain.  Good tolerance to other exercise progression.  Pt cautioned to with foam rolling to avoid excessive torque of knee with planted foot.  Ok to begin light jump rope during home work outs if tolerated.   Progress strengthening /stabilization /functional activity           Manual Therapy:    10     mins  36818;  Therapeutic Exercise:    30     mins  45238;     Neuromuscular Omer:    15    mins  54734;    Therapeutic Activity:     -     mins  95570;     Gait Training:      -     mins  09933;     Ultrasound:     -     mins  07700;    Electrical Stimulation:    15     mins  73029 ( );  Dry Needling     -     mins self-pay      Timed Treatment:   55   mins   Total Treatment:     70   mins        Chela Schultz PT  Physical Therapist  [place] License # [id number]

## 2017-08-07 ENCOUNTER — TREATMENT (OUTPATIENT)
Dept: PHYSICAL THERAPY | Facility: CLINIC | Age: 52
End: 2017-08-07

## 2017-08-07 DIAGNOSIS — M25.561 CHRONIC PAIN OF RIGHT KNEE: ICD-10-CM

## 2017-08-07 DIAGNOSIS — G89.29 CHRONIC PAIN OF RIGHT KNEE: ICD-10-CM

## 2017-08-07 DIAGNOSIS — S83.241D ACUTE TEAR MEDIAL MENISCUS, RIGHT, SUBSEQUENT ENCOUNTER: Primary | ICD-10-CM

## 2017-08-07 PROCEDURE — 97110 THERAPEUTIC EXERCISES: CPT | Performed by: PHYSICAL THERAPIST

## 2017-08-07 PROCEDURE — 97140 MANUAL THERAPY 1/> REGIONS: CPT | Performed by: PHYSICAL THERAPIST

## 2017-08-07 PROCEDURE — 97112 NEUROMUSCULAR REEDUCATION: CPT | Performed by: PHYSICAL THERAPIST

## 2017-08-07 PROCEDURE — G0283 ELEC STIM OTHER THAN WOUND: HCPCS | Performed by: PHYSICAL THERAPIST

## 2017-08-07 NOTE — PROGRESS NOTES
Re-Assessment / Re-Certification      Patient: Kita Zamora   : 1965  Diagnosis/ICD-10 Code:  Acute tear medial meniscus, right, subsequent encounter [S83.241D]  Referring practitioner: Alphonso Kaufman *  Date of Initial Visit: 17  Today's Date: 2017  Patient seen for 13 sessions      Subjective:   Clinical Progress: improved  Home Program Compliance: Yes  Treatment has included: therapeutic exercise, neuromuscular re-education, manual therapy, electrical stimulation, ultrasound and cryotherapy    Subjective Evaluation    History of Present Illness  Mechanism of injury: Every once and awhile while stair climbing notes sharp pain along medial knee and quad tendon.  Also notices similar pain with deep squatting.  Wears brace while working out and has started walking around the neighborhood.      Pain  Current pain ratin  Pain scale at highest: 5-9/10.  Location: R medial jt line  Quality: sharp         Objective     Tenderness     Right Knee   Tenderness in the medial joint line.     Active Range of Motion     Right Knee   Flexion: 130 degrees with pain  Extension: WFL    Strength/Myotome Testing     Right Hip   Planes of Motion   Extension: 5  Abduction: 5    Functional Assessment   Squat   Pain.      Assessment & Plan     Assessment  Assessment details: Pt is making steady progress toward returning to functional activities but continues to exhibit pain with deep squats, stairs and forward/backward lunges.  Pt would benefit from a continued course of skilled PT to decrease pain and restore function.        Progress toward previous goals: Partially Met    Goals  STG In 4-6 weeks  1. Pt to be independent with HEP - MET  2. Pt to exhibit full, non painful R knee flex AROM to 130 to allow for improved ability to navigate curbs and stairs - MET  3. Pt to report decreased pain with stairclimbing. - progressing    LTG In 6-12 weeks - UNMET but progressing  1. Pt to resume home workouts with video  instruction avoiding pivoting without pain > 2/10  2. LEFS >/= 70/80  3. Pt to perform squat to 90 degrees without pain or lateral shift  4. Pain not > 2/10 with ADLs  5. Pt to tolerate lunges allowing for joint compression without joint line pain. - progressing         Recommendations: Continue with recommendations add US to plan of care  Timeframe: 1 month  Prognosis to achieve goals: good    PT Signature: Chela Schultz, PT  [place] License # [id number]    Based upon review of the patient's progress and continued therapy plan, it is my medical opinion that Kita Zamora should continue physical therapy treatment at [facility]  [address] [facility], [address] [address] [phone].    Signature: __________________________________  Alphonso Kaufman Jr., DO    Manual Therapy:    10     mins  75706;  Therapeutic Exercise:    30     mins  57695;     Neuromuscular Omer:    10    mins  81116;    Therapeutic Activity:     -     mins  79351;     Gait Training:      -     mins  39714;     Ultrasound:     8     mins  05470;    Electrical Stimulation:    15     mins  68312 ( );  Dry Needling     -     mins self-pay      Timed Treatment:   58   mins   Total Treatment:     75   mins

## 2017-08-10 ENCOUNTER — TREATMENT (OUTPATIENT)
Dept: PHYSICAL THERAPY | Facility: CLINIC | Age: 52
End: 2017-08-10

## 2017-08-10 DIAGNOSIS — G89.29 CHRONIC PAIN OF RIGHT KNEE: ICD-10-CM

## 2017-08-10 DIAGNOSIS — M25.561 CHRONIC PAIN OF RIGHT KNEE: ICD-10-CM

## 2017-08-10 DIAGNOSIS — S83.241D ACUTE TEAR MEDIAL MENISCUS, RIGHT, SUBSEQUENT ENCOUNTER: Primary | ICD-10-CM

## 2017-08-10 PROCEDURE — 97112 NEUROMUSCULAR REEDUCATION: CPT | Performed by: PHYSICAL THERAPIST

## 2017-08-10 PROCEDURE — G0283 ELEC STIM OTHER THAN WOUND: HCPCS | Performed by: PHYSICAL THERAPIST

## 2017-08-10 PROCEDURE — 97110 THERAPEUTIC EXERCISES: CPT | Performed by: PHYSICAL THERAPIST

## 2017-08-10 PROCEDURE — 97140 MANUAL THERAPY 1/> REGIONS: CPT | Performed by: PHYSICAL THERAPIST

## 2017-08-10 NOTE — PROGRESS NOTES
Physical Therapy Daily Progress Note    Visit # : 14  Kita Lay reports: has been working out more and leg feels sore vs painful    Subjective     Objective   See Exercise, Manual, and Modality Logs for complete treatment.       Assessment/Plan  No reports of pain with advanced [facility] exercise program.  Pt responding favorably to skilled PT with use of modalities to control pain/inflammation.  Progress per Plan of Care           Manual Therapy:    10     mins  80388;  Therapeutic Exercise:    20     mins  15819;     Neuromuscular Omer:    10    mins  28363;    Therapeutic Activity:     -     mins  59113;     Gait Training:      -     mins  09500;     Ultrasound:     8     mins  16042;    Electrical Stimulation:    15     mins  47713 ( );  Dry Needling     -     mins self-pay      Timed Treatment:   48   mins   Total Treatment:     65   mins        Chela Schultz PT  Physical Therapist  [place] License # [id number]

## 2017-08-14 ENCOUNTER — TREATMENT (OUTPATIENT)
Dept: PHYSICAL THERAPY | Facility: CLINIC | Age: 52
End: 2017-08-14

## 2017-08-14 DIAGNOSIS — G89.29 CHRONIC PAIN OF RIGHT KNEE: ICD-10-CM

## 2017-08-14 DIAGNOSIS — S83.241D ACUTE TEAR MEDIAL MENISCUS, RIGHT, SUBSEQUENT ENCOUNTER: Primary | ICD-10-CM

## 2017-08-14 DIAGNOSIS — M25.561 CHRONIC PAIN OF RIGHT KNEE: ICD-10-CM

## 2017-08-14 PROCEDURE — 97110 THERAPEUTIC EXERCISES: CPT | Performed by: PHYSICAL THERAPIST

## 2017-08-14 PROCEDURE — 97112 NEUROMUSCULAR REEDUCATION: CPT | Performed by: PHYSICAL THERAPIST

## 2017-08-14 PROCEDURE — 97140 MANUAL THERAPY 1/> REGIONS: CPT | Performed by: PHYSICAL THERAPIST

## 2017-08-14 NOTE — PROGRESS NOTES
Physical Therapy Daily Progress Note    Visit # : 15  Kita Lay reports: knee feels good.  Only occasional twinges.  Has returned to full workout program.      Subjective     Objective   See Exercise, Manual, and Modality Logs for complete treatment.       Assessment/Plan  Pt has progressed very well in physical therapy and is ready for focus on HEP.  Pt encouraged to utilize foam rolling to control persistent lateral leg tightness.  Pt to return to PT only if unable to manage symptoms independently.  Other           Manual Therapy:    10     mins  35447;  Therapeutic Exercise:   20      mins  65606;     Neuromuscular Omer:    10    mins  31310;    Therapeutic Activity:     -     mins  90836;     Gait Training:      -     mins  40594;     Ultrasound:     -     mins  90427;    Electrical Stimulation:    -     mins  67584 ( );  Dry Needling     -     mins self-pay      Timed Treatment:   40   mins   Total Treatment:     50   mins        Chela Schultz, PT  Physical Therapist  [place] License # [id number]

## 2017-08-28 ENCOUNTER — TREATMENT (OUTPATIENT)
Dept: PHYSICAL THERAPY | Facility: CLINIC | Age: 52
End: 2017-08-28

## 2017-08-28 DIAGNOSIS — G89.29 CHRONIC PAIN OF RIGHT KNEE: ICD-10-CM

## 2017-08-28 DIAGNOSIS — M25.561 CHRONIC PAIN OF RIGHT KNEE: ICD-10-CM

## 2017-08-28 DIAGNOSIS — S83.241D ACUTE TEAR MEDIAL MENISCUS, RIGHT, SUBSEQUENT ENCOUNTER: Primary | ICD-10-CM

## 2017-08-28 PROCEDURE — 97140 MANUAL THERAPY 1/> REGIONS: CPT | Performed by: PHYSICAL THERAPIST

## 2017-08-28 PROCEDURE — 97112 NEUROMUSCULAR REEDUCATION: CPT | Performed by: PHYSICAL THERAPIST

## 2017-08-28 PROCEDURE — 97110 THERAPEUTIC EXERCISES: CPT | Performed by: PHYSICAL THERAPIST

## 2017-08-28 NOTE — PROGRESS NOTES
Physical Therapy Daily Progress Note    Visit # : 16  Kita Lay reports: pt reports just getting back from [address] within a 4 day period and knee feels weak and wobbly.  Worried it is going to hyperextend.  Notices popliteal tenderness with recumbent bike.  Has returned to home workouts and did a lot of squats today.      Subjective     Objective   See Exercise, Manual, and Modality Logs for complete treatment.       Assessment/Plan  Improved hip/L-S dissociation noted with plank activities today.  Increased symptoms likely due to prolonged driving.  Good tolerance to exercise progression.  Return as needed.  Other           Manual Therapy:    10     mins  70954;  Therapeutic Exercise:    8     mins  83734;     Neuromuscular Omer:    22    mins  36250;    Therapeutic Activity:     -     mins  30106;     Gait Training:      -     mins  94654;     Ultrasound:     -     mins  78755;    Electrical Stimulation:    -     mins  73807 ( );  Dry Needling     -     mins self-pay      Timed Treatment:   40   mins   Total Treatment:     50   mins        Chela Schultz PT  Physical Therapist  [place] License # [id number]

## 2017-08-28 NOTE — PATIENT INSTRUCTIONS
Access Code: [email or web address]   URL: [email or web address]   Date: 08/28/2017   Prepared by: Hemalatha Schultz     Exercises  Supine Active Straight Leg Raise - 10 reps - 2 sets - 3 hold - 2x daily  Straight Leg Raise with External Rotation - 10 reps - 2 sets - 3 hold - 1x daily  Band Walks - 15 reps - 1 sets - 1x daily  Reverse Band Walks - 15 reps - 1 sets - 1x daily  Side Stepping with Resistance at Thighs - 15 reps - 1 sets - 1x daily  Hip Abduction with Resistance on Platform and Hands Behind Head - 15 reps - 1 sets - 3 hold - 1x daily  Isometric Squat with Resistance Loop - 10 reps - 1 sets - 5 hold - 1x daily  Upright Side Lunge - 10 reps - 1 sets - 3 hold - 1x daily  Single Leg Cone Touch - 10 reps - 2 sets - 3 hold - 1x daily  Standard Plank - 2 reps - 1 sets - 20 hold - 1x daily  Plank with Hip Extension - 5 reps - 2 sets - 1x daily  Modified Side Plank with Hip Abduction - 10 reps - 2 sets - 3 hold - 1x daily  Sidelying IT Band Foam Roll Mobilization - 10 reps - 2 sets - 1x daily  Supine ITB Stretch with Strap - 2 reps - 1 sets - 20 hold - 1x daily

## 2017-10-02 ENCOUNTER — DOCUMENTATION (OUTPATIENT)
Dept: PHYSICAL THERAPY | Facility: CLINIC | Age: 52
End: 2017-10-02

## 2017-10-02 NOTE — PROGRESS NOTES
Discharge Summary  Discharge Summary from Physical Therapy Report      Dates  PT visit: 6/8-8/2/17  Number of Visits: 16     Discharge Status of Patient: Improved hip/L-S dissociation noted with plank activities today.  Increased symptoms likely due to prolonged driving.  Good tolerance to exercise progression.  Return as needed.    Goals: Partially Met    Discharge Plan: Continue with current home exercise program as instructed    Comments Pt has not been seen for scheduled PT visit for > 30 days from last met appointment.  Due to incomplete PT plan of care and inability to determine goal achievement, Pt will be discharged from PT.  Current status is unable to be determined at this time.  Pt to follow up as needed with new established POC.        Date of Discharge 10/2/17        Chela Schultz, PT  Physical Therapist

## 2018-10-02 DIAGNOSIS — Z00.00 ANNUAL PHYSICAL EXAM: Primary | ICD-10-CM

## 2018-10-02 DIAGNOSIS — Z13.220 SCREENING CHOLESTEROL LEVEL: ICD-10-CM

## 2018-10-02 DIAGNOSIS — Z11.59 NEED FOR HEPATITIS C SCREENING TEST: ICD-10-CM

## 2018-10-04 LAB
ALBUMIN SERPL-MCNC: 5.1 G/DL (ref 3.5–5.2)
ALBUMIN/GLOB SERPL: 1.8 G/DL
ALP SERPL-CCNC: 89 U/L (ref 39–117)
ALT SERPL-CCNC: 17 U/L (ref 1–33)
APPEARANCE UR: CLEAR
AST SERPL-CCNC: 15 U/L (ref 1–32)
BACTERIA #/AREA URNS HPF: NORMAL /HPF
BILIRUB SERPL-MCNC: 0.5 MG/DL (ref 0.1–1.2)
BILIRUB UR QL STRIP: NEGATIVE
BUN SERPL-MCNC: 21 MG/DL (ref 6–20)
BUN/CREAT SERPL: 26.3 (ref 7–25)
CALCIUM SERPL-MCNC: 10.6 MG/DL (ref 8.6–10.5)
CHLORIDE SERPL-SCNC: 98 MMOL/L (ref 98–107)
CHOLEST SERPL-MCNC: 151 MG/DL (ref 0–200)
CHOLEST/HDLC SERPL: 2.32 {RATIO}
CO2 SERPL-SCNC: 25.5 MMOL/L (ref 22–29)
COLOR UR: YELLOW
CREAT SERPL-MCNC: 0.8 MG/DL (ref 0.57–1)
EPI CELLS #/AREA URNS HPF: NORMAL /HPF
GLOBULIN SER CALC-MCNC: 2.8 GM/DL
GLUCOSE SERPL-MCNC: 96 MG/DL (ref 65–99)
GLUCOSE UR QL: NEGATIVE
HCV AB S/CO SERPL IA: <0.1 S/CO RATIO (ref 0–0.9)
HDLC SERPL-MCNC: 65 MG/DL (ref 40–60)
HGB UR QL STRIP: NEGATIVE
KETONES UR QL STRIP: NEGATIVE
LDLC SERPL CALC-MCNC: 75 MG/DL (ref 0–100)
LEUKOCYTE ESTERASE UR QL STRIP: NEGATIVE
MICRO URNS: NORMAL
MICRO URNS: NORMAL
MUCOUS THREADS URNS QL MICRO: PRESENT /HPF
NITRITE UR QL STRIP: NEGATIVE
PH UR STRIP: 5.5 [PH] (ref 5–7.5)
POTASSIUM SERPL-SCNC: 4.9 MMOL/L (ref 3.5–5.2)
PROT SERPL-MCNC: 7.9 G/DL (ref 6–8.5)
PROT UR QL STRIP: NEGATIVE
RBC #/AREA URNS HPF: NORMAL /HPF
SODIUM SERPL-SCNC: 137 MMOL/L (ref 136–145)
SP GR UR: 1.02 (ref 1–1.03)
TRIGL SERPL-MCNC: 56 MG/DL (ref 0–150)
URINALYSIS REFLEX: NORMAL
UROBILINOGEN UR STRIP-MCNC: 0.2 MG/DL (ref 0.2–1)
VLDLC SERPL CALC-MCNC: 11.2 MG/DL (ref 5–40)
WBC #/AREA URNS HPF: NORMAL /HPF

## 2018-10-18 ENCOUNTER — OFFICE VISIT (OUTPATIENT)
Dept: SPORTS MEDICINE | Facility: CLINIC | Age: 53
End: 2018-10-18

## 2018-10-18 VITALS
SYSTOLIC BLOOD PRESSURE: 130 MMHG | WEIGHT: 122.6 LBS | DIASTOLIC BLOOD PRESSURE: 84 MMHG | HEIGHT: 64 IN | BODY MASS INDEX: 20.93 KG/M2 | HEART RATE: 70 BPM | OXYGEN SATURATION: 99 %

## 2018-10-18 DIAGNOSIS — Z00.00 ANNUAL PHYSICAL EXAM: Primary | ICD-10-CM

## 2018-10-18 DIAGNOSIS — D12.6 TUBULAR ADENOMA OF COLON: ICD-10-CM

## 2018-10-18 DIAGNOSIS — Z12.31 SCREENING MAMMOGRAM, ENCOUNTER FOR: ICD-10-CM

## 2018-10-18 PROCEDURE — 99396 PREV VISIT EST AGE 40-64: CPT | Performed by: FAMILY MEDICINE

## 2018-10-18 NOTE — PROGRESS NOTES
"Kita Zamora is here today for an annual physical exam.     Eating a healthy diet. Exercising routinely.     I have reviewed the patient's medical, family, and social history in detail and updated the computerized patient record.    Screening history:  Colonoscopy - h/o tubular adenoma. Past due for repeat scope.  MMG, pap - needs GYN, due for MMG  Metabolic - last year    Health Maintenance   Topic Date Due   • ANNUAL PHYSICAL  06/14/1968   • ZOSTER VACCINE (1 of 2) 06/14/2014   • PAP SMEAR  05/03/2017   • PT PLAN OF CARE  06/08/2017   • MAMMOGRAM  06/09/2017   • INFLUENZA VACCINE  08/01/2018   • TDAP/TD VACCINES (2 - Td) 11/15/2023   • COLONOSCOPY  08/04/2024   • HEPATITIS C SCREENING  Completed       Review of Systems   Constitutional: Negative for chills, fatigue and fever.   HENT: Negative for postnasal drip and sore throat.    Eyes: Negative for pain.   Respiratory: Negative for cough, chest tightness and wheezing.    Cardiovascular: Negative for chest pain.   Gastrointestinal: Negative.    Musculoskeletal: Negative for myalgias.   Skin: Negative for rash.   Neurological: Negative for numbness and headaches.   Psychiatric/Behavioral: Negative.    All other systems reviewed and are negative.      /84   Pulse 70   Ht 161.3 cm (63.5\")   Wt 55.6 kg (122 lb 9.6 oz)   LMP  (LMP Unknown)   SpO2 99%   BMI 21.38 kg/m²      Physical Exam    Vital signs reviewed.  General appearance: No acute distress  Eyes: conjunctiva clear without erythema; pupils equally round and reactive  ENT: external ears and nose normal; hearing normal, oropharynx clear  Neck: supple; no thyromegaly  CV: normal rate and rhythm; no peripheral edema  Respiratory: normal respiratory effort; lungs clear to auscultation bilaterally  MSK: normal gait and station; no focal joint deformity or swelling  Skin: no rash or wounds; normal turgor  Neuro: cranial nerves 2-12 grossly intact; normal sensation to light touch  Psych: mood and affect " normal; recent and remote memory intact    No visits with results within 2 Week(s) from this visit.   Latest known visit with results is:   Orders Only on 10/02/2018   Component Date Value Ref Range Status   • Glucose 10/03/2018 96  65 - 99 mg/dL Final   • BUN 10/03/2018 21* 6 - 20 mg/dL Final   • Creatinine 10/03/2018 0.80  0.57 - 1.00 mg/dL Final   • eGFR Non  Am 10/03/2018 75  >60 mL/min/1.73 Final   • eGFR African Am 10/03/2018 91  >60 mL/min/1.73 Final   • BUN/Creatinine Ratio 10/03/2018 26.3* 7.0 - 25.0 Final   • Sodium 10/03/2018 137  136 - 145 mmol/L Final   • Potassium 10/03/2018 4.9  3.5 - 5.2 mmol/L Final   • Chloride 10/03/2018 98  98 - 107 mmol/L Final   • Total CO2 10/03/2018 25.5  22.0 - 29.0 mmol/L Final   • Calcium 10/03/2018 10.6* 8.6 - 10.5 mg/dL Final   • Total Protein 10/03/2018 7.9  6.0 - 8.5 g/dL Final   • Albumin 10/03/2018 5.10  3.50 - 5.20 g/dL Final   • Globulin 10/03/2018 2.8  gm/dL Final   • A/G Ratio 10/03/2018 1.8  g/dL Final   • Total Bilirubin 10/03/2018 0.5  0.1 - 1.2 mg/dL Final   • Alkaline Phosphatase 10/03/2018 89  39 - 117 U/L Final   • AST (SGOT) 10/03/2018 15  1 - 32 U/L Final   • ALT (SGPT) 10/03/2018 17  1 - 33 U/L Final   • Total Cholesterol 10/03/2018 151  0 - 200 mg/dL Final   • Triglycerides 10/03/2018 56  0 - 150 mg/dL Final   • HDL Cholesterol 10/03/2018 65* 40 - 60 mg/dL Final   • VLDL Cholesterol 10/03/2018 11.2  5 - 40 mg/dL Final   • LDL Cholesterol  10/03/2018 75  0 - 100 mg/dL Final   • Chol/HDL Ratio 10/03/2018 2.32   Final   • Specific Gravity, UA 10/03/2018 1.023  1.005 - 1.030 Final   • pH, UA 10/03/2018 5.5  5.0 - 7.5 Final   • Color, UA 10/03/2018 Yellow  Yellow Final   • Appearance, UA 10/03/2018 Clear  Clear Final   • Leukocytes, UA 10/03/2018 Negative  Negative Final   • Protein 10/03/2018 Negative  Negative/Trace Final   • Glucose, UA 10/03/2018 Negative  Negative Final   • Ketones 10/03/2018 Negative  Negative Final   • Blood, UA 10/03/2018  Negative  Negative Final   • Bilirubin, UA 10/03/2018 Negative  Negative Final   • Urobilinogen, UA 10/03/2018 0.2  0.2 - 1.0 mg/dL Final   • Nitrite, UA 10/03/2018 Negative  Negative Final   • Microscopic Examination 10/03/2018 Comment   Final    Microscopic follows if indicated.   • MICROSCOPIC EXAMINATION 10/03/2018 See below:   Final    Microscopic was indicated and was performed.   • Urinalysis Reflex 10/03/2018 Comment   Final    This specimen will not reflex to a Urine Culture.   • Hep C Virus Ab 10/03/2018 <0.1  0.0 - 0.9 s/co ratio Final    Comment:                                   Negative:     < 0.8                               Indeterminate: 0.8 - 0.9                                    Positive:     > 0.9   The CDC recommends that a positive HCV antibody result   be followed up with a HCV Nucleic Acid Amplification   test ([id number]).     • WBC, UA 10/03/2018 0-5  0 - 5 /hpf Final   • RBC, UA 10/03/2018 0-2  0 - 2 /hpf Final   • Epithelial Cells (non renal) 10/03/2018 None seen  0 - 10 /hpf Final   • Mucus, UA 10/03/2018 Present  Not Estab. /hpf Final   • Bacteria, UA 10/03/2018 Few  None seen/Few /hpf Final        Kita was seen today for annual exam.    Diagnoses and all orders for this visit:    Annual physical exam    Tubular adenoma of colon  -     Ambulatory Referral For Screening Colonoscopy    Screening mammogram, encounter for  -     Mammo screening bilateral w CAD; Future        Encourage healthy diet and exercise.  Encourage patient to stay up to date on screening examinations as indicated based on age and risk factors.    EMR Dragon/Transcription disclaimer:    Much of this encounter note is an electronic transcription/translation of spoken language to printed text.  The electronic translation of spoken language may permit erroneous, or at times, nonsensical words or phrases to be inadvertently transcribed.  Although I have reviewed the note for such errors some may still exist.

## 2018-11-02 ENCOUNTER — TELEPHONE (OUTPATIENT)
Dept: SPORTS MEDICINE | Facility: CLINIC | Age: 53
End: 2018-11-02

## 2018-11-02 NOTE — TELEPHONE ENCOUNTER
Pt called requesting to have colonoscopy performed. Dr. Mccullough needs previous records faxed over,

## 2018-11-05 NOTE — TELEPHONE ENCOUNTER
I have no previous record to fax of her prior c[facility]. [facility] cannot print.

## 2018-11-13 ENCOUNTER — HOSPITAL ENCOUNTER (OUTPATIENT)
Dept: MAMMOGRAPHY | Facility: HOSPITAL | Age: 53
Discharge: HOME | End: 2018-11-13
Admitting: FAMILY MEDICINE

## 2018-11-13 DIAGNOSIS — Z12.11 SCREENING FOR COLON CANCER: Primary | ICD-10-CM

## 2018-11-13 DIAGNOSIS — Z12.31 SCREENING MAMMOGRAM, ENCOUNTER FOR: ICD-10-CM

## 2018-11-13 PROCEDURE — 77067 SCR MAMMO BI INCL CAD: CPT

## 2018-11-20 ENCOUNTER — TELEPHONE (OUTPATIENT)
Dept: SPORTS MEDICINE | Facility: CLINIC | Age: 53
End: 2018-11-20

## 2018-12-17 ENCOUNTER — OUTSIDE FACILITY SERVICE (OUTPATIENT)
Dept: SURGERY | Facility: CLINIC | Age: 53
End: 2018-12-17

## 2018-12-17 PROCEDURE — 45378 DIAGNOSTIC COLONOSCOPY: CPT | Performed by: SURGERY

## 2019-01-09 ENCOUNTER — OFFICE VISIT (OUTPATIENT)
Dept: OBSTETRICS AND GYNECOLOGY | Facility: CLINIC | Age: 54
End: 2019-01-09

## 2019-01-09 VITALS
WEIGHT: 127.8 LBS | BODY MASS INDEX: 21.82 KG/M2 | SYSTOLIC BLOOD PRESSURE: 118 MMHG | DIASTOLIC BLOOD PRESSURE: 82 MMHG | HEIGHT: 64 IN

## 2019-01-09 DIAGNOSIS — Z13.9 SCREENING FOR CONDITION: Primary | ICD-10-CM

## 2019-01-09 DIAGNOSIS — Z01.419 WELL WOMAN EXAM WITH ROUTINE GYNECOLOGICAL EXAM: ICD-10-CM

## 2019-01-09 LAB
BILIRUB BLD-MCNC: NEGATIVE MG/DL
CLARITY, POC: CLEAR
COLOR UR: YELLOW
GLUCOSE UR STRIP-MCNC: NEGATIVE MG/DL
KETONES UR QL: NEGATIVE
LEUKOCYTE EST, POC: NEGATIVE
NITRITE UR-MCNC: NEGATIVE MG/ML
PH UR: 5 [PH] (ref 5–8)
PROT UR STRIP-MCNC: NEGATIVE MG/DL
RBC # UR STRIP: NEGATIVE /UL
SP GR UR: 1 (ref 1–1.03)
UROBILINOGEN UR QL: NORMAL

## 2019-01-09 PROCEDURE — 99386 PREV VISIT NEW AGE 40-64: CPT | Performed by: OBSTETRICS & GYNECOLOGY

## 2019-01-09 PROCEDURE — 81002 URINALYSIS NONAUTO W/O SCOPE: CPT | Performed by: OBSTETRICS & GYNECOLOGY

## 2019-01-09 NOTE — PROGRESS NOTES
GYN Annual Exam     CC- Here for annual exam.     Kita Zamora is a 53 y.o. female new patient who presents for annual well woman exam. She went off LoLoestrin at age 51 and has not had a cycles since. No gun issues. She has occ vaginal dryness but does not want meds, enc lubricants for sex.       OB History      Para Term  AB Living    3 3 3     3    SAB TAB Ectopic Molar Multiple Live Births                       Obstetric Comments    3           Menarche:14  Menopause:51  HRT:none  Current contraception: post menopausal status  History of abnormal Pap smear: no  History of abnormal mammogram: no  Family history of uterine, colon or ovarian cancer: yes - mom age 52 colon  Family history of breast cancer: no  STD's: none  Last pap smear:?       Health Maintenance   Topic Date Due   • ZOSTER VACCINE (1 of 2) 2014   • INFLUENZA VACCINE  2018   • ANNUAL PHYSICAL  10/19/2019   • MAMMOGRAM  2020   • PAP SMEAR  2022   • TDAP/TD VACCINES (2 - Td) 11/15/2023   • COLONOSCOPY  2024   • HEPATITIS C SCREENING  Completed   • HEPATITIS A VACCINE ADULT  Completed       Past Medical History:   Diagnosis Date   • Colon polyp        Past Surgical History:   Procedure Laterality Date   • NO PAST SURGERIES           Current Outpatient Medications:   •  loratadine (CLARITIN) 10 MG tablet, Take 10 mg by mouth Daily., Disp: , Rfl:     No Known Allergies    Social History     Tobacco Use   • Smoking status: Never Smoker   • Smokeless tobacco: Never Used   Substance Use Topics   • Alcohol use: Yes     Comment: Social    • Drug use: No       Family History   Problem Relation Age of Onset   • Diabetes Mother    • Colon cancer Mother    • Hypertension Mother    • Diabetes Father    • Hypertension Father    • Heart attack Father    • Breast cancer Neg Hx    • Ovarian cancer Neg Hx    • Deep vein thrombosis Neg Hx    • Pulmonary embolism Neg Hx        Review of Systems   Constitutional: Negative for  "appetite change, fatigue, fever and unexpected weight change.   Respiratory: Negative for cough and shortness of breath.    Cardiovascular: Negative for chest pain and palpitations.   Gastrointestinal: Negative for abdominal distention, abdominal pain, constipation, diarrhea and nausea.   Endocrine: Negative for cold intolerance and heat intolerance.   Genitourinary: Negative for dyspareunia, dysuria, menstrual problem, pelvic pain and vaginal discharge.   Skin: Negative for color change and rash.   Allergic/Immunologic: Positive for environmental allergies (seasonal).   Neurological: Negative for headaches.   Psychiatric/Behavioral: Negative for dysphoric mood. The patient is not nervous/anxious.        /82   Ht 161.3 cm (63.5\")   Wt 58 kg (127 lb 12.8 oz)   LMP  (LMP Unknown)   BMI 22.28 kg/m²     Physical Exam   Constitutional: She is oriented to person, place, and time. She appears well-developed and well-nourished.   HENT:   Head: Normocephalic and atraumatic.   Eyes: Conjunctivae are normal. No scleral icterus.   Neck: Neck supple. No thyromegaly present.   Cardiovascular: Normal rate and regular rhythm.   Pulmonary/Chest: Effort normal and breath sounds normal. Right breast exhibits no inverted nipple, no mass, no nipple discharge, no skin change and no tenderness. Left breast exhibits no inverted nipple, no mass, no nipple discharge, no skin change and no tenderness.   Abdominal: Soft. Bowel sounds are normal. She exhibits no distension and no mass. There is no tenderness. There is no rebound and no guarding. No hernia.   Genitourinary: Uterus normal. Pelvic exam was performed with patient supine. There is no rash, tenderness or lesion on the right labia. There is no rash, tenderness or lesion on the left labia. Cervix exhibits no motion tenderness, no discharge and no friability. Right adnexum displays no mass, no tenderness and no fullness. Left adnexum displays no mass, no tenderness and no " fullness. No erythema, tenderness or bleeding in the vagina. No foreign body in the vagina. No signs of injury around the vagina. No vaginal discharge found.   Neurological: She is alert and oriented to person, place, and time.   Skin: Skin is warm and dry.   Psychiatric: She has a normal mood and affect. Her behavior is normal. Judgment and thought content normal.   Nursing note and vitals reviewed.         Assessment/Plan    1) GYN HM: check pap/HPV   SBE demonstrated and encouraged.  2) STD screening: declines Condoms encouraged.  3) Bone health - Weight bearing exercise, dietary calcium recommendations and vitamin D reviewed.   4) Diet and Exercise discussed  5) Smoking Status: No   6) Social: no issues  7)MMG:  UTD 11/2018 B1  8) DEXA- plan age 54  9)C scope- UTD 12/2018, repeat 5 years   Follow up prn and 1 year       Kita was seen today for gynecologic exam.    Diagnoses and all orders for this visit:    Screening for condition  -     POC Urinalysis Dipstick    Well woman exam with routine gynecological exam  -     Pap IG, HPV-hr        Jane Greenfield MD  1/9/19  3:03 PM

## 2019-01-11 LAB
CYTOLOGIST CVX/VAG CYTO: NORMAL
CYTOLOGY CVX/VAG DOC THIN PREP: NORMAL
DX ICD CODE: NORMAL
HIV 1 & 2 AB SER-IMP: NORMAL
HPV I/H RISK 1 DNA CVX QL PROBE+SIG AMP: NEGATIVE
OTHER STN SPEC: NORMAL
PATH REPORT.FINAL DX SPEC: NORMAL
STAT OF ADQ CVX/VAG CYTO-IMP: NORMAL

## 2020-01-15 ENCOUNTER — OFFICE VISIT (OUTPATIENT)
Dept: OBSTETRICS AND GYNECOLOGY | Facility: CLINIC | Age: 55
End: 2020-01-15

## 2020-01-15 VITALS — SYSTOLIC BLOOD PRESSURE: 110 MMHG | DIASTOLIC BLOOD PRESSURE: 62 MMHG

## 2020-01-15 DIAGNOSIS — Z13.9 SCREENING FOR CONDITION: ICD-10-CM

## 2020-01-15 DIAGNOSIS — Z01.419 ROUTINE GYNECOLOGICAL EXAMINATION: ICD-10-CM

## 2020-01-15 DIAGNOSIS — Z11.51 SPECIAL SCREENING EXAMINATION FOR HUMAN PAPILLOMAVIRUS (HPV): ICD-10-CM

## 2020-01-15 DIAGNOSIS — Z01.419 PAP SMEAR, LOW-RISK: Primary | ICD-10-CM

## 2020-01-15 DIAGNOSIS — Z01.419 ENCOUNTER FOR GYNECOLOGICAL EXAMINATION WITHOUT ABNORMAL FINDING: ICD-10-CM

## 2020-01-15 PROCEDURE — 99396 PREV VISIT EST AGE 40-64: CPT | Performed by: OBSTETRICS & GYNECOLOGY

## 2020-01-15 RX ORDER — LORATADINE 10 MG/1
CAPSULE, LIQUID FILLED ORAL
COMMUNITY

## 2020-01-15 NOTE — PROGRESS NOTES
GYN Annual Exam     CC- Here for annual exam.     Kita Zamora is a 54 y.o. female est who presents for annual well woman exam. She went off LoLoestrin at age 51 and has not had a cycles since. NO  VB. She has some midl HF, not bad. No pain wthi sex but does need some lubricants.     OB History        3    Para   3    Term   3            AB        Living   3       SAB        TAB        Ectopic        Molar        Multiple        Live Births              Obstetric Comments   3              Menarche:14  Menopause:51  HRT:none  Current contraception: post menopausal status  History of abnormal Pap smear: no  History of abnormal mammogram: no  Family history of uterine, colon or ovarian cancer: yes - mom age 52 colon  Family history of breast cancer: no  STD's: none  Last pap smear:2019- nl pap/HPV  RADHA: none      Health Maintenance   Topic Date Due   • ZOSTER VACCINE (1 of 2) 2014   • INFLUENZA VACCINE  2019   • ANNUAL PHYSICAL  10/19/2019   • Annual Gynecologic Pelvic and Breast Exam  01/10/2020   • MAMMOGRAM  2020   • PAP SMEAR  2022   • TDAP/TD VACCINES (2 - Td) 11/15/2023   • COLONOSCOPY  2024   • HEPATITIS C SCREENING  Completed       Past Medical History:   Diagnosis Date   • Colon polyp        Past Surgical History:   Procedure Laterality Date   • NO PAST SURGERIES           Current Outpatient Medications:   •  Loratadine (CLARITIN) 10 MG capsule, Take  by mouth., Disp: , Rfl:     No Known Allergies    Social History     Tobacco Use   • Smoking status: Never Smoker   • Smokeless tobacco: Never Used   Substance Use Topics   • Alcohol use: Yes     Comment: Social    • Drug use: No       Family History   Problem Relation Age of Onset   • Diabetes Mother    • Colon cancer Mother    • Hypertension Mother    • Diabetes Father    • Hypertension Father    • Heart attack Father    • Breast cancer Neg Hx    • Ovarian cancer Neg Hx    • Deep vein thrombosis Neg Hx    •  Pulmonary embolism Neg Hx        Review of Systems   Constitutional: Negative for appetite change, fatigue, fever and unexpected weight change.   Respiratory: Negative for cough and shortness of breath.    Cardiovascular: Negative for chest pain and palpitations.   Gastrointestinal: Negative for abdominal distention, abdominal pain, constipation, diarrhea and nausea.   Endocrine: Positive for heat intolerance. Negative for cold intolerance.   Genitourinary: Negative for dyspareunia, dysuria, menstrual problem, pelvic pain and vaginal discharge.   Skin: Negative for color change and rash.   Allergic/Immunologic: Positive for environmental allergies (seasonal).   Neurological: Negative for headaches.   Psychiatric/Behavioral: Negative for dysphoric mood. The patient is not nervous/anxious.        /62   LMP  (LMP Unknown)   Breastfeeding No     Physical Exam   Constitutional: She is oriented to person, place, and time. She appears well-developed and well-nourished.   HENT:   Head: Normocephalic and atraumatic.   Eyes: Conjunctivae are normal. No scleral icterus.   Neck: Neck supple. No thyromegaly present.   Cardiovascular: Normal rate and regular rhythm.   Pulmonary/Chest: Effort normal and breath sounds normal. Right breast exhibits no inverted nipple, no mass, no nipple discharge, no skin change and no tenderness. Left breast exhibits no inverted nipple, no mass, no nipple discharge, no skin change and no tenderness.   Abdominal: Soft. Bowel sounds are normal. She exhibits no distension and no mass. There is no tenderness. There is no rebound and no guarding. No hernia.   Genitourinary: Uterus normal. Pelvic exam was performed with patient supine. There is no rash, tenderness or lesion on the right labia. There is no rash, tenderness or lesion on the left labia. Cervix exhibits no motion tenderness, no discharge and no friability. Right adnexum displays no mass, no tenderness and no fullness. Left adnexum  displays no mass, no tenderness and no fullness. No erythema, tenderness or bleeding in the vagina. No foreign body in the vagina. No signs of injury around the vagina. No vaginal discharge found.   Neurological: She is alert and oriented to person, place, and time.   Skin: Skin is warm and dry.   Psychiatric: She has a normal mood and affect. Her behavior is normal. Judgment and thought content normal.   Nursing note and vitals reviewed.         Assessment/Plan    1) GYN HM: normal pap/HPV  1/2019 SBE demonstrated and encouraged.  2) STD screening: declines Condoms encouraged.  3) Bone health - Weight bearing exercise, dietary calcium recommendations and vitamin D reviewed.   4) Diet and Exercise discussed  5) Smoking Status: No   6) Social: no issues  7)MMG:  UTD 11/2018 B1, due now, enc compliance.   8) DEXA- plan age 54, schedule now  9)C scope- UTD 12/2018, repeat 5 years  10)Parts of this document have been copied or forwarded from her previous visits and have been reviewed, updated and edited as indicated.    11)Follow up prn and 1 year       Kita was seen today for gynecologic exam.    Diagnoses and all orders for this visit:    Pap smear, low-risk  -     POC Urinalysis Dipstick  -     Pap IG, HPV-hr    Routine gynecological examination  -     POC Urinalysis Dipstick  -     Pap IG, HPV-hr    Special screening examination for human papillomavirus (HPV)  -     POC Urinalysis Dipstick  -     Pap IG, HPV-hr        Jane Greenfield MD  1/15/2020  10:29 AM

## 2020-02-17 ENCOUNTER — HOSPITAL ENCOUNTER (OUTPATIENT)
Dept: BONE DENSITY | Facility: HOSPITAL | Age: 55
Discharge: HOME | End: 2020-02-17
Admitting: OBSTETRICS & GYNECOLOGY

## 2020-02-17 ENCOUNTER — HOSPITAL ENCOUNTER (OUTPATIENT)
Dept: MAMMOGRAPHY | Facility: HOSPITAL | Age: 55
Discharge: HOME | End: 2020-02-17

## 2020-02-17 DIAGNOSIS — Z13.9 SCREENING FOR CONDITION: ICD-10-CM

## 2020-02-17 PROCEDURE — 77067 SCR MAMMO BI INCL CAD: CPT

## 2020-02-17 PROCEDURE — 77080 DXA BONE DENSITY AXIAL: CPT

## 2020-02-17 PROCEDURE — 77063 BREAST TOMOSYNTHESIS BI: CPT

## 2020-08-25 ENCOUNTER — TELEPHONE (OUTPATIENT)
Dept: OBSTETRICS AND GYNECOLOGY | Facility: CLINIC | Age: 55
End: 2020-08-25

## 2020-08-25 NOTE — TELEPHONE ENCOUNTER
Pt called stating that she found a over the counter supplement at [employer] store called  Femmenessence - Hormone Balance Supplement. She is wanting to know is this safe for her to take. Pt states she is experiencing Hot Flashes, Not sleeping Well, and sweats. Please Advise.

## 2020-08-25 NOTE — TELEPHONE ENCOUNTER
I looked this up and there is not a lot of data on this supplement. No OTC supplement is regulated by the FDA, so data is almost always scarce for these types of products. It is hard to recommend for or against them since we don't really know what is in any of these supplements. I would try a well known OTC brand such as Estroven first, since at least there is some experiential data about it's safety. Anmol